# Patient Record
Sex: FEMALE | Race: OTHER | HISPANIC OR LATINO | Employment: UNEMPLOYED | URBAN - METROPOLITAN AREA
[De-identification: names, ages, dates, MRNs, and addresses within clinical notes are randomized per-mention and may not be internally consistent; named-entity substitution may affect disease eponyms.]

---

## 2021-12-12 ENCOUNTER — HOSPITAL ENCOUNTER (EMERGENCY)
Facility: HOSPITAL | Age: 61
Discharge: HOME | End: 2021-12-12
Attending: EMERGENCY MEDICINE

## 2021-12-12 VITALS
DIASTOLIC BLOOD PRESSURE: 78 MMHG | HEIGHT: 63 IN | HEART RATE: 86 BPM | OXYGEN SATURATION: 100 % | RESPIRATION RATE: 20 BRPM | BODY MASS INDEX: 29.53 KG/M2 | SYSTOLIC BLOOD PRESSURE: 156 MMHG | WEIGHT: 166.67 LBS | TEMPERATURE: 98.7 F

## 2021-12-12 DIAGNOSIS — I83.899 BLEEDING FROM VARICOSE VEIN: Primary | ICD-10-CM

## 2021-12-12 PROCEDURE — 90471 IMMUNIZATION ADMIN: CPT

## 2021-12-12 PROCEDURE — 12001 RPR S/N/AX/GEN/TRNK 2.5CM/<: CPT | Performed by: EMERGENCY MEDICINE

## 2021-12-12 PROCEDURE — 99282 EMERGENCY DEPT VISIT SF MDM: CPT | Performed by: EMERGENCY MEDICINE

## 2021-12-12 PROCEDURE — 99283 EMERGENCY DEPT VISIT LOW MDM: CPT

## 2021-12-12 PROCEDURE — 90715 TDAP VACCINE 7 YRS/> IM: CPT | Performed by: EMERGENCY MEDICINE

## 2021-12-12 RX ADMIN — TETANUS TOXOID, REDUCED DIPHTHERIA TOXOID AND ACELLULAR PERTUSSIS VACCINE, ADSORBED 0.5 ML: 5; 2.5; 8; 8; 2.5 SUSPENSION INTRAMUSCULAR at 00:51

## 2024-01-17 ENCOUNTER — TELEPHONE (OUTPATIENT)
Dept: OBGYN CLINIC | Facility: CLINIC | Age: 64
End: 2024-01-17

## 2024-05-15 ENCOUNTER — HOSPITAL ENCOUNTER (EMERGENCY)
Facility: HOSPITAL | Age: 64
Discharge: HOME | End: 2024-05-15
Attending: EMERGENCY MEDICINE
Payer: COMMERCIAL

## 2024-05-15 VITALS
OXYGEN SATURATION: 99 % | DIASTOLIC BLOOD PRESSURE: 93 MMHG | SYSTOLIC BLOOD PRESSURE: 160 MMHG | RESPIRATION RATE: 17 BRPM | TEMPERATURE: 97.9 F | BODY MASS INDEX: 26.17 KG/M2 | WEIGHT: 147.71 LBS | HEART RATE: 83 BPM

## 2024-05-15 DIAGNOSIS — S91.311A LACERATION OF DORSUM OF RIGHT FOOT: Primary | ICD-10-CM

## 2024-05-15 PROCEDURE — 99284 EMERGENCY DEPT VISIT MOD MDM: CPT | Performed by: EMERGENCY MEDICINE

## 2024-05-15 PROCEDURE — 99282 EMERGENCY DEPT VISIT SF MDM: CPT

## 2024-05-15 PROCEDURE — 12001 RPR S/N/AX/GEN/TRNK 2.5CM/<: CPT | Performed by: EMERGENCY MEDICINE

## 2024-05-15 NOTE — ED PROVIDER NOTES
History  Chief Complaint   Patient presents with    Foot Pain     Pt c/o pain to R foot. Pt reports bleeding from varicose vein on R foot. Pt denies trauma or injury to area. Bleeding controlled in triage     HPI    64 year old patient presenting to the emergency department for bleeding to the right foot from a varicose vein. Patient reports she woke up late last night to use the bathroom and noticed her foot bleeding. Patient was able to stop the bleeding at home with pressure. She does not recall a specific injury to her foot. Patient reports this happened before a few years ago and skin glue was applied to her foot to protect it. Patient denies pain to her right foot.     Prior to Admission Medications   Prescriptions Last Dose Informant Patient Reported? Taking?   Cholecalciferol 125 MCG (5000 UT) capsule   Yes No   Sig: Take 5,000 Units by mouth daily   VITAMIN D, ERGOCALCIFEROL, PO   Yes No   Sig: Take by mouth   azelastine (ASTELIN) 0.1 % nasal spray   Yes No   Si-2 sprays into each nostril 2 (two) times a day   fexofenadine (ALLEGRA) 180 MG tablet   Yes No   Sig: Take 180 mg by mouth daily   fluticasone (FLONASE) 50 mcg/act nasal spray   Yes No   Si sprays into each nostril daily   olmesartan (BENICAR) 20 mg tablet   Yes No   Sig: Take 10 mg by mouth daily   vitamin E, tocopherol, 400 units capsule   No No   Sig: Take 1 capsule (400 Units total) by mouth daily      Facility-Administered Medications: None       Past Medical History:   Diagnosis Date    Hypertension     No known health problems        Past Surgical History:   Procedure Laterality Date    BLADDER SURGERY      HYSTERECTOMY         No family history on file.  I have reviewed and agree with the history as documented.    E-Cigarette/Vaping     E-Cigarette/Vaping Substances     Social History     Tobacco Use    Smoking status: Never    Smokeless tobacco: Never   Substance Use Topics    Alcohol use: No    Drug use: No        Review of Systems    Constitutional: Negative.    HENT: Negative.     Eyes: Negative.    Respiratory: Negative.     Cardiovascular: Negative.    Gastrointestinal: Negative.    Genitourinary: Negative.    Musculoskeletal: Negative.    Skin:  Positive for wound.        Small right foot skin tear with no active bleeding upon arrival to the ED   Neurological: Negative.        Physical Exam  ED Triage Vitals [05/15/24 1132]   Temperature Pulse Respirations Blood Pressure SpO2   97.9 °F (36.6 °C) 83 17 160/93 99 %      Temp Source Heart Rate Source Patient Position - Orthostatic VS BP Location FiO2 (%)   Oral Monitor Sitting Right arm --      Pain Score       --             Orthostatic Vital Signs  Vitals:    05/15/24 1132   BP: 160/93   Pulse: 83   Patient Position - Orthostatic VS: Sitting       Physical Exam  HENT:      Head: Normocephalic.      Nose: Nose normal.      Mouth/Throat:      Mouth: Mucous membranes are moist.      Pharynx: Oropharynx is clear.   Eyes:      Pupils: Pupils are equal, round, and reactive to light.   Cardiovascular:      Rate and Rhythm: Normal rate and regular rhythm.      Pulses: Normal pulses.      Heart sounds: Normal heart sounds.   Pulmonary:      Effort: Pulmonary effort is normal.      Breath sounds: Normal breath sounds.   Abdominal:      General: Bowel sounds are normal.   Musculoskeletal:         General: Normal range of motion.      Cervical back: Normal range of motion.   Skin:     Capillary Refill: Capillary refill takes less than 2 seconds.      Findings: Lesion present.      Comments: Two small punctate circular superficial wounds to the anterior lateral right distal ankle/foot with no active bleeding, no pain on palpation, no erythema, and no edema.   Neurological:      Mental Status: She is alert and oriented to person, place, and time.         ED Medications  Medications - No data to display    Diagnostic Studies  Results Reviewed       None                   No orders to display          Procedures  Universal Protocol:  Consent: Verbal consent obtained.  Consent given by: patient  Patient understanding: patient states understanding of the procedure being performed  Patient identity confirmed: verbally with patient  Laceration repair    Date/Time: 5/15/2024 12:18 PM    Performed by: Flavio Ayers DPM  Authorized by: Flavio Ayers DPM  Body area: lower extremity  Location details: right foot  Laceration length: 0.3 cm  Foreign bodies: no foreign bodies  Tendon involvement: none  Nerve involvement: none  Vascular damage: no    Wound Dehiscence:  Superficial Wound Dehiscence: simple closure      Procedure Details:  Irrigation solution: saline  Irrigation method: syringe  Amount of cleaning: standard  Debridement: none  Degree of undermining: none  Approximation difficulty: simple  Patient tolerance: patient tolerated the procedure well with no immediate complications  Comments: After verbal consent was obtained, the two small superficial punctate wounds were cleansed with saline and dried bleed removed. Closure was then achieved with skin adhesive.            ED Course                                       Medical Decision Making  64 year old patient presenting with right foot laceration with no active bleeding upon arrival to the ED. Patient reports no pain and there is no localized erythema and no edema. No clinical suspension for osseous involvement or wound infection. Laceration was cleansed with saline and closed with skin adhesive at bedside, see procedure note. Laceration covered with bandage and surgical shoe applied to right foot to reduce friction to the laceration repair. Patient advised to keep clean, dry bandage over the repair and that the skin adhesive will peel off over the next few days. Advised patient to return to ED if symptoms worsen. Patient is stable for discharge to home.          Disposition  Final diagnoses:   None     ED Disposition       None          Follow-up  Information    None         Patient's Medications   Discharge Prescriptions    No medications on file     No discharge procedures on file.    PDMP Review       None             ED Provider  Attending physically available and evaluated Susan Watson. I managed the patient along with the ED Attending.    Electronically Signed by           Flavio Ayers DPM  05/15/24 0157

## 2024-05-15 NOTE — ED ATTENDING ATTESTATION
5/15/2024  I, Memo Laird MD, saw and evaluated the patient. I have discussed the patient with the resident/non-physician practitioner and agree with the resident's/non-physician practitioner's findings, Plan of Care, and MDM as documented in the resident's/non-physician practitioner's note, except where noted. All available labs and Radiology studies were reviewed.  I was present for key portions of any procedure(s) performed by the resident/non-physician practitioner and I was immediately available to provide assistance.       At this point I agree with the current assessment done in the Emergency Department.  I have conducted an independent evaluation of this patient a history and physical is as follows:      Right foot area over varicose vein was bleeding. Has excoriation directly over varicose vein. Does not look infected. It is not currently bleeding. There are 2 areas. Skin glue. Can follow up with vascular as an outpatient. Surgical shoe as this area is over an inconvenient place where shoes may rub on this area.  ED Course         Critical Care Time  Procedures

## 2024-05-15 NOTE — DISCHARGE INSTRUCTIONS
Wear surgical shoe for the next 3-4 days to prevent excessive friction to the laceration site  Follow up with PCP as needed if area become painful, red, and swollen  Keep laceration covered with a clean bandage until healed

## 2024-07-15 ENCOUNTER — CONSULT (OUTPATIENT)
Dept: VASCULAR SURGERY | Facility: CLINIC | Age: 64
End: 2024-07-15
Payer: COMMERCIAL

## 2024-07-15 VITALS
DIASTOLIC BLOOD PRESSURE: 90 MMHG | HEART RATE: 74 BPM | OXYGEN SATURATION: 98 % | BODY MASS INDEX: 26.4 KG/M2 | HEIGHT: 63 IN | WEIGHT: 149 LBS | SYSTOLIC BLOOD PRESSURE: 142 MMHG

## 2024-07-15 DIAGNOSIS — I83.891 BLEEDING FROM VARICOSE VEINS OF LOWER EXTREMITY, RIGHT: Primary | ICD-10-CM

## 2024-07-15 DIAGNOSIS — I87.2 VENOUS INSUFFICIENCY: ICD-10-CM

## 2024-07-15 PROCEDURE — 99244 OFF/OP CNSLTJ NEW/EST MOD 40: CPT | Performed by: NURSE PRACTITIONER

## 2024-07-15 NOTE — ASSESSMENT & PLAN NOTE
Reports R lateral distal ankle had 3 bleeding events. Most recently 3 weeks ago.   -Discussed sclerotherapy and informed pt and family that first visit is for consultation purposes only.   -Educated on conservative treatment for now until f/u appointment.

## 2024-07-15 NOTE — PROGRESS NOTES
Ambulatory Visit  Name: Susan Watson      : 1960      MRN: [id number]  Encounter Provider: CLOVIS Post  Encounter Date: 7/15/2024   Encounter department: [facility]    64-year-old female with no pmhx presents to the office with hx of a RLE bleeding vein about 3 weeks ago.    Assessment & Plan   1. Bleeding from varicose veins of lower extremity, right  Assessment & Plan:  Reports R lateral distal ankle had 3 bleeding events. Most recently 3 weeks ago.   -Discussed sclerotherapy and informed pt and family that first visit is for consultation purposes only.   -Educated on conservative treatment for now until f/u appointment.       Orders:  -     Compression Stocking  2. Venous insufficiency  Assessment & Plan:  Pt presents with b/l R>L leg swelling.     -Reviewed LEV from outside hospital from 24 which demonstrates no evidence of DVT in the visualized bilateral lower extremity.   -No evident truncal/ large varicosities.   -Denies use of compression in the past. Denies leg elevation.   -No hx of vein surgeries.  -Reports b/l burning in the dorsum on feet with throbbing in the medial ankles.   -Palpable 2+ DP and PT pulses. No concerns for arterial disease.   -Reviewed pathophysiology of venous insufficiencey and treatment with conservative measures at this time with leg compression, elevation and exercise. Pt verbalized understanding and is agreeable to this plan.     Recommendations   -Return to the office in 3 months for f/u with compression.   -Start wearing compression. RX given today with education on how to use.  -Leg elevation. Goal of 3-4 times a day 15 minutes at a time.  -Increase exercise and ambulation.Goal of 3-4 times a week for 30 min.  -Apply moisturizing cream to the b/l legs to maintain skin integrity and prevent breakdown.  -Call the office with any new or worsening symptoms.   Orders:  -     Ambulatory Referral to Vascular Surgery  -     Compression  Stocking      History of Present Illness     Patient presents for evaluation of lower extremities. She reports a bleeding vein from her right foot 3 weeks ago. She reports pain and swelling of BLE, right is worse than left. She denies compression usage. She is a non smoker.       Susan Watson is a 64 y.o. female who presents with no pmhx presents to the office with hx of a RLE bleeding vein about 3 weeks ago.    R lateral ankle had bleeding incident x3.   When she was finished taking a bath, it started bleeding. The last time 3 weeks ago she was sleeping and got up to go to the bathroom when the R lateral ankle started bleeding.   Once she was seen in the ED for the bleeding area and they gave her recommendations to elevated her leg. That was one year ago.   She reports pain with walking b/l burning and stabbing. Burning on the bottoms of her feet. Throbbing at the medial calfes.   Review of Systems   Constitutional: Negative.    HENT: Negative.     Eyes: Negative.    Respiratory: Negative.     Cardiovascular:  Positive for leg swelling (RLE).   Gastrointestinal: Negative.    Endocrine: Negative.    Genitourinary: Negative.    Musculoskeletal: Negative.    Skin: Negative.    Allergic/Immunologic: Negative.    Neurological: Negative.    Hematological: Negative.    Psychiatric/Behavioral: Negative.       Medical History Reviewed by provider this encounter:  Tobacco  Allergies  Meds  Problems  Med Hx  Surg Hx  Fam Hx       Past Medical History   Past Medical History:   Diagnosis Date    Hypertension     No known health problems      Past Surgical History:   Procedure Laterality Date    BLADDER SURGERY      HYSTERECTOMY       History reviewed. No pertinent family history.  Current Outpatient Medications on File Prior to Visit   Medication Sig Dispense Refill    azelastine (ASTELIN) 0.1 % nasal spray 1-2 sprays into each nostril 2 (two) times a day      fexofenadine (ALLEGRA) 180 MG tablet Take 180 mg by  "mouth daily      VITAMIN D, ERGOCALCIFEROL, PO Take by mouth      vitamin E, tocopherol, 400 units capsule Take 1 capsule (400 Units total) by mouth daily 30 capsule 11    Cholecalciferol 125 MCG (5000 UT) capsule Take 5,000 Units by mouth daily      fluticasone (FLONASE) 50 mcg/act nasal spray 2 sprays into each nostril daily      olmesartan (BENICAR) 20 mg tablet Take 10 mg by mouth daily       No current facility-administered medications on file prior to visit.     Allergies   Allergen Reactions    Corticosteroids Hives    Cortisone     Hydrocortisone Rash      Current Outpatient Medications on File Prior to Visit   Medication Sig Dispense Refill    azelastine (ASTELIN) 0.1 % nasal spray 1-2 sprays into each nostril 2 (two) times a day      fexofenadine (ALLEGRA) 180 MG tablet Take 180 mg by mouth daily      VITAMIN D, ERGOCALCIFEROL, PO Take by mouth      vitamin E, tocopherol, 400 units capsule Take 1 capsule (400 Units total) by mouth daily 30 capsule 11    Cholecalciferol 125 MCG (5000 UT) capsule Take 5,000 Units by mouth daily      fluticasone (FLONASE) 50 mcg/act nasal spray 2 sprays into each nostril daily      olmesartan (BENICAR) 20 mg tablet Take 10 mg by mouth daily       No current facility-administered medications on file prior to visit.      Social History     Tobacco Use    Smoking status: Never    Smokeless tobacco: Never   Substance and Sexual Activity    Alcohol use: No    Drug use: No    Sexual activity: Yes     Objective     /90 (BP Location: Left arm, Patient Position: Sitting, Cuff Size: Standard)   Pulse 74   Ht 5' 3\" (1.6 m)   Wt 67.6 kg (149 lb)   SpO2 98%   BMI 26.39 kg/m²     Physical Exam  Vitals and nursing note reviewed.   Constitutional:       General: She is not in acute distress.     Appearance: Normal appearance. She is not ill-appearing.   HENT:      Head: Normocephalic and atraumatic.   Cardiovascular:      Rate and Rhythm: Normal rate and regular rhythm.      " Pulses: Normal pulses.           Dorsalis pedis pulses are 2+ on the right side and 2+ on the left side.        Posterior tibial pulses are 2+ on the right side and 2+ on the left side.      Heart sounds: No murmur heard.  Pulmonary:      Effort: Pulmonary effort is normal. No respiratory distress.      Breath sounds: Normal breath sounds.   Musculoskeletal:      Right lower le+ Edema present.      Left lower le+ Edema present.   Skin:     General: Skin is warm and dry.      Findings: No erythema or rash.   Neurological:      General: No focal deficit present.      Mental Status: She is alert and oriented to person, place, and time.      Sensory: Sensory deficit present.      Motor: No weakness.      Gait: Gait normal.   Psychiatric:         Mood and Affect: Mood normal.         Behavior: Behavior normal.       Administrative Statements   I have spent a total time of 30 minutes in caring for this patient on the day of the visit/encounter including Diagnostic results, Risks and benefits of tx options, Instructions for management, Patient and family education, Importance of tx compliance, Risk factor reductions, Documenting in the medical record, Reviewing / ordering tests, medicine, procedures  , and Obtaining or reviewing history  .

## 2024-07-15 NOTE — ASSESSMENT & PLAN NOTE
Pt presents with b/l R>L leg swelling.     -Reviewed LEV from outside hospital from 6/28/24 which demonstrates no evidence of DVT in the visualized bilateral lower extremity.   -No evident truncal/ large varicosities.   -Denies use of compression in the past. Denies leg elevation.   -No hx of vein surgeries.  -Reports b/l burning in the dorsum on feet with throbbing in the medial ankles.   -Palpable 2+ DP and PT pulses. No concerns for arterial disease.   -Reviewed pathophysiology of venous insufficiencey and treatment with conservative measures at this time with leg compression, elevation and exercise. Pt verbalized understanding and is agreeable to this plan.     Recommendations   -Return to the office in 3 months for f/u with compression.   -Start wearing compression. RX given today with education on how to use.  -Leg elevation. Goal of 3-4 times a day 15 minutes at a time.  -Increase exercise and ambulation.Goal of 3-4 times a week for 30 min.  -Apply moisturizing cream to the b/l legs to maintain skin integrity and prevent breakdown.  -Call the office with any new or worsening symptoms.

## 2024-07-15 NOTE — PATIENT INSTRUCTIONS
"- Call the office if you experience any changes to your legs or feet such as new pain, redness or swelling.    - Stay active. Exercise everyday. Walking is the recommended exercise with a goal of 30 min 3-4 times a week. A healthy weight can assist in decreasing varicose vein symptoms.     - Wear Compression. Put them on in the morning, wear all day and take off before bed at night.     -Elevate your legs above the level of your heart. Elevate for 15 minutes 3-4 times a day.     -Keep a low sodium diet    -When looking at buying compression, look for \"gradient compression\" with a weight 20-30mmHg (medium weight), knee high is fine.  -Try \"Arctrieval\"    -A good brand is Sigvaris, soft opaque, knee high.   "

## 2024-08-13 ENCOUNTER — TELEPHONE (OUTPATIENT)
Dept: VASCULAR SURGERY | Facility: CLINIC | Age: 64
End: 2024-08-13

## 2024-08-13 NOTE — TELEPHONE ENCOUNTER
Called patient due to being a no show for her appointment today 8/13/2024, for injection.  Patient said that she is sick today.  She said that her daughter will call back to reschedule the appointment.

## 2024-09-04 ENCOUNTER — HOSPITAL ENCOUNTER (EMERGENCY)
Facility: HOSPITAL | Age: 64
Discharge: HOME | End: 2024-09-04
Attending: EMERGENCY MEDICINE
Payer: COMMERCIAL

## 2024-09-04 VITALS
TEMPERATURE: 97.8 F | BODY MASS INDEX: 25.7 KG/M2 | HEART RATE: 62 BPM | OXYGEN SATURATION: 100 % | WEIGHT: 145.06 LBS | SYSTOLIC BLOOD PRESSURE: 122 MMHG | RESPIRATION RATE: 16 BRPM | DIASTOLIC BLOOD PRESSURE: 77 MMHG

## 2024-09-04 DIAGNOSIS — L50.9 URTICARIA: Primary | ICD-10-CM

## 2024-09-04 PROCEDURE — 99284 EMERGENCY DEPT VISIT MOD MDM: CPT

## 2024-09-04 PROCEDURE — 99282 EMERGENCY DEPT VISIT SF MDM: CPT

## 2024-09-04 RX ORDER — PREDNISONE 20 MG/1
60 TABLET ORAL ONCE
Status: COMPLETED | OUTPATIENT
Start: 2024-09-04 | End: 2024-09-04

## 2024-09-04 RX ORDER — EPINEPHRINE 0.3 MG/.3ML
0.3 INJECTION SUBCUTANEOUS ONCE
Qty: 0.6 ML | Refills: 0 | Status: SHIPPED | OUTPATIENT
Start: 2024-09-04 | End: 2024-09-04

## 2024-09-04 RX ORDER — FAMOTIDINE 20 MG/1
20 TABLET, FILM COATED ORAL 2 TIMES DAILY
Qty: 30 TABLET | Refills: 0 | Status: SHIPPED | OUTPATIENT
Start: 2024-09-04

## 2024-09-04 RX ORDER — FAMOTIDINE 20 MG/1
20 TABLET, FILM COATED ORAL ONCE
Status: COMPLETED | OUTPATIENT
Start: 2024-09-04 | End: 2024-09-04

## 2024-09-04 RX ORDER — DIPHENHYDRAMINE HCL 25 MG
25 TABLET ORAL ONCE
Status: COMPLETED | OUTPATIENT
Start: 2024-09-04 | End: 2024-09-04

## 2024-09-04 RX ORDER — DIPHENHYDRAMINE HCL 25 MG
25 TABLET ORAL EVERY 6 HOURS
Qty: 20 TABLET | Refills: 0 | Status: SHIPPED | OUTPATIENT
Start: 2024-09-04

## 2024-09-04 RX ORDER — PREDNISONE 20 MG/1
60 TABLET ORAL DAILY
Qty: 6 TABLET | Refills: 0 | Status: SHIPPED | OUTPATIENT
Start: 2024-09-04 | End: 2024-09-06

## 2024-09-04 RX ADMIN — DIPHENHYDRAMINE HYDROCHLORIDE 25 MG: 25 TABLET ORAL at 11:46

## 2024-09-04 RX ADMIN — PREDNISONE 60 MG: 20 TABLET ORAL at 11:47

## 2024-09-04 RX ADMIN — FAMOTIDINE 20 MG: 20 TABLET, FILM COATED ORAL at 11:48

## 2024-09-04 NOTE — DISCHARGE INSTRUCTIONS
Patient advised follow-up PCP for today's ED visit.  Patient advised to return to the ED with any worsening symptoms explained on discharge.  Patient advised to follow-up with allergist for today's ED visit.  Patient was advised to return to the ED with any worsening symptoms that were explained on discharge including but not limited to chest pain, shortness of breath, irretractable vomiting or diarrhea, vision loss, loss of function, loss of sensation, syncope, hemoptysis, hematochezia, hematemesis, melena, decreased oral intake, feeling ill.

## 2024-09-04 NOTE — Clinical Note
Susan Watson was seen and treated in our emergency department on 9/4/2024.                Diagnosis: allegic reaction    Susan  .    She may return on this date: 09/05/2024         If you have any questions or concerns, please don't hesitate to call.      Larry Dueñas PA-C    ______________________________           _______________          _______________  Hospital Representative                              Date                                Time

## 2024-09-04 NOTE — ED PROVIDER NOTES
History  Chief Complaint   Patient presents with    Rash     Pt c/o whole body rash that started yesterday. Pt states the rash itches. Pt denies cp/sob/n/v/d or fevers     64-year-old female presenting ED with generalized body rash.  Patient states the symptoms started yesterday denies any new exposures.  Denies any new perfumes or lotions or detergents.  Patient states that when the symptoms started yesterday she did use an EpiPen without relief of symptoms.  She denies trying any other medications.  Patient stated that the rash is pruritic.  She denies any shortness of breath or trouble breathing.  Denies any tongue swelling or facial swelling at this time.  Patient stated that the rash has been getting worse.  She denies any chest pain along with the rash.  Patient stated that she does have an allergy to steroids as a long time ago she had erythema multiforme and received a hydrocortisone injection in which she had some facial swelling.  She stated she has since had no reaction to any steroids.Patient denies any chest pain, shortness of breath, vomiting, diarrhea, chills, diaphoresis, fevers, loss of consciousness, syncope, urinary and bowel changes, abdominal pain, visual symptoms, vision loss, loss of function, loss of sensation, decreased oral intake, hemoptysis, hematochezia, hematemesis, melena, confusion.         Prior to Admission Medications   Prescriptions Last Dose Informant Patient Reported? Taking?   Cholecalciferol 125 MCG (5000 UT) capsule   Yes No   Sig: Take 5,000 Units by mouth daily   VITAMIN D, ERGOCALCIFEROL, PO  Self Yes No   Sig: Take by mouth   azelastine (ASTELIN) 0.1 % nasal spray  Self Yes No   Si-2 sprays into each nostril 2 (two) times a day   fexofenadine (ALLEGRA) 180 MG tablet  Self Yes No   Sig: Take 180 mg by mouth daily   fluticasone (FLONASE) 50 mcg/act nasal spray   Yes No   Si sprays into each nostril daily   olmesartan (BENICAR) 20 mg tablet   Yes No   Sig: Take 10 mg  by mouth daily   vitamin E, tocopherol, 400 units capsule  Self No No   Sig: Take 1 capsule (400 Units total) by mouth daily      Facility-Administered Medications: None       Past Medical History:   Diagnosis Date    Hypertension     No known health problems        Past Surgical History:   Procedure Laterality Date    BLADDER SURGERY      HYSTERECTOMY         History reviewed. No pertinent family history.  I have reviewed and agree with the history as documented.    E-Cigarette/Vaping    E-Cigarette Use Never User      E-Cigarette/Vaping Substances     Social History     Tobacco Use    Smoking status: Never    Smokeless tobacco: Never   Vaping Use    Vaping status: Never Used   Substance Use Topics    Alcohol use: No    Drug use: No       Review of Systems   Constitutional:  Negative for chills, diaphoresis, fatigue and fever.   HENT:  Negative for congestion, drooling, ear discharge, ear pain, postnasal drip, rhinorrhea, sinus pressure, sinus pain and sore throat.    Eyes:  Negative for photophobia and visual disturbance.   Respiratory:  Negative for cough, chest tightness and shortness of breath.    Cardiovascular:  Negative for chest pain and palpitations.   Gastrointestinal:  Negative for abdominal distention, abdominal pain, constipation, diarrhea, nausea and vomiting.   Genitourinary:  Negative for difficulty urinating, dysuria, flank pain, frequency and hematuria.   Musculoskeletal:  Negative for arthralgias, back pain, joint swelling, myalgias, neck pain and neck stiffness.   Skin:  Positive for rash. Negative for wound.   Neurological:  Negative for dizziness, tremors, syncope, facial asymmetry, weakness, light-headedness, numbness and headaches.       Physical Exam  Physical Exam  Constitutional:       General: She is not in acute distress.     Appearance: Normal appearance. She is not ill-appearing, toxic-appearing or diaphoretic.   HENT:      Head: Normocephalic.      Right Ear: Tympanic membrane, ear  canal and external ear normal.      Left Ear: Tympanic membrane, ear canal and external ear normal.      Nose: Nose normal. No congestion or rhinorrhea.      Mouth/Throat:      Mouth: Mucous membranes are moist.      Pharynx: Oropharynx is clear. No oropharyngeal exudate or posterior oropharyngeal erythema.   Eyes:      General:         Right eye: No discharge.         Left eye: No discharge.      Extraocular Movements: Extraocular movements intact.      Conjunctiva/sclera: Conjunctivae normal.      Pupils: Pupils are equal, round, and reactive to light.   Cardiovascular:      Rate and Rhythm: Normal rate and regular rhythm.      Pulses: Normal pulses.   Pulmonary:      Effort: Pulmonary effort is normal. No respiratory distress.      Breath sounds: Normal breath sounds. No stridor. No wheezing, rhonchi or rales.   Chest:      Chest wall: No tenderness.   Abdominal:      General: Bowel sounds are normal. There is no distension.      Palpations: Abdomen is soft.      Tenderness: There is no abdominal tenderness. There is no right CVA tenderness, left CVA tenderness, guarding or rebound.   Musculoskeletal:         General: No swelling, tenderness, deformity or signs of injury. Normal range of motion.      Cervical back: Neck supple. No rigidity or tenderness.   Lymphadenopathy:      Cervical: No cervical adenopathy.   Skin:     General: Skin is warm and dry.      Capillary Refill: Capillary refill takes less than 2 seconds.      Findings: Rash present.      Comments: Generalized urticarial rash.   Neurological:      Mental Status: She is alert and oriented to person, place, and time.      Sensory: No sensory deficit.   Psychiatric:         Mood and Affect: Mood normal.         Vital Signs  ED Triage Vitals [09/04/24 1105]   Temperature Pulse Respirations Blood Pressure SpO2   97.8 °F (36.6 °C) 67 17 140/74 98 %      Temp Source Heart Rate Source Patient Position - Orthostatic VS BP Location FiO2 (%)   Oral Monitor  Sitting Right arm --      Pain Score       10 - Worst Possible Pain           Vitals:    09/04/24 1105 09/04/24 1346   BP: 140/74 122/77   Pulse: 67 62   Patient Position - Orthostatic VS: Sitting Lying         Visual Acuity      ED Medications  Medications   predniSONE tablet 60 mg (60 mg Oral Given 9/4/24 1147)   diphenhydrAMINE (BENADRYL) tablet 25 mg (25 mg Oral Given 9/4/24 1146)   famotidine (PEPCID) tablet 20 mg (20 mg Oral Given 9/4/24 1148)       Diagnostic Studies  Results Reviewed       None                   No orders to display              Procedures  Procedures         ED Course                                 SBIRT 22yo+      Flowsheet Row Most Recent Value   Initial Alcohol Screen: US AUDIT-C     1. How often do you have a drink containing alcohol? 0 Filed at: 09/04/2024 1112   2. How many drinks containing alcohol do you have on a typical day you are drinking?  0 Filed at: 09/04/2024 1112   3a. Male UNDER 65: How often do you have five or more drinks on one occasion? 0 Filed at: 09/04/2024 1112   3b. FEMALE Any Age, or MALE 65+: How often do you have 4 or more drinks on one occassion? 0 Filed at: 09/04/2024 1112   Audit-C Score 0 Filed at: 09/04/2024 1112   DANIELLE: How many times in the past year have you...    Used an illegal drug or used a prescription medication for non-medical reasons? Never Filed at: 09/04/2024 1112                      Medical Decision Making  64-year-old female presented ED for rash over full body.  On exam cardiopulmonary exam is benign.  Patient's airway is patent.  No facial swelling or tongue swelling noted on exam.  Patient does have a urticaria rash over the back or so arms sparing the hands buttocks explosion thighs.  She denies any contact sensitivities or new lotions detergents or fragrances.  Abdominal exam is benign.  Nonpainful lesions but they are pruritic.  So patient did have a reaction to a cortisone injection which she received many years ago.  He advised  "patient we will try prednisone and keep the area for 2 hours to monitor as then they started if she were to develop any reaction to this.  Patient was agreeable to this.  Over the 2 hours patient's symptoms continually improved.  Patient also given famotidine and Benadryl in the ED.  Patient stated that although she still has not A lesion to her symptoms of the rash have completely dissipated.  At the 3-hour juana I did explain to the patient that she has not had any reaction and she is feeling much better we will send her home at this time.  Patient discharged with 2 days worth of prednisone along with famotidine and Benadryl for symptom relief.  Patient also sent with EpiPen which was sent to the pharmacy.  Patient given strict return protocol with any worsening of the rash or any worsening symptoms.  Status is imperative if she has any facial swelling that she will need to return to the ED.  Ambulatory referral to allergist was made for possible patch testing.  Disposition explained with follow-up.Patient understood diagnosis and treatment plan and had no further questions.  Patient was discharged in stable condition.  Patient was advised to follow-up with her PCP in 1 to 2 days.  Patient was advised to return to the ED with any worsening symptoms that were explained on discharge including but not limited to chest pain, shortness of breath, irretractable vomiting or diarrhea, vision loss, loss of function, loss of sensation, syncope, hemoptysis, hematochezia, hematemesis, melena, decreased oral intake, feeling ill.     Ddx-allergic reaction, contact dermatitis, urticaria, anaphylaxis, dermatitis    Portions of the record may have been created with voice recognition software. Occasional wrong word or \"sound a like\" substitutions may have occurred due to the inherent limitations of voice recognition software. Read the chart carefully and recognize, using context, where substitutions have occurred.      Risk  OTC " drugs.  Prescription drug management.  Risk Details: Risk of worsening symptoms along with signs and symptoms worsening symptoms were thoroughly explained on discharge.  Risk of incomplete follow-up was discussed.  Patient had full understanding of all risks had no further questions and was discharged in stable condition.                  Disposition  Final diagnoses:   Urticaria     Time reflects when diagnosis was documented in both MDM as applicable and the Disposition within this note       Time User Action Codes Description Comment    9/4/2024  2:10 PM Larry Dueñas Add [L50.9] Urticaria           ED Disposition       ED Disposition   Discharge    Condition   Stable    Date/Time   Wed Sep 4, 2024 1412    Comment   Susan Watson discharge to home/self care.                   Follow-up Information       Follow up With Specialties Details Why Contact Info Additional Information    Kourtney Echeverria MD Family Medicine   [facility] [address] [address]  [phone]       [facility] Emergency Medicine   [address] [place] [id number] [phone] [facility] Department, [address] [place] [address], [id number]            Discharge Medication List as of 9/4/2024  2:13 PM        START taking these medications    Details   diphenhydrAMINE (BENADRYL) 25 mg tablet Take 1 tablet (25 mg total) by mouth every 6 (six) hours, Starting Wed 9/4/2024, Normal      EPINEPHrine (EPIPEN) 0.3 mg/0.3 mL SOAJ Inject 0.3 mL (0.3 mg total) into a muscle once for 1 dose, Starting Wed 9/4/2024, Normal      famotidine (PEPCID) 20 mg tablet Take 1 tablet (20 mg total) by mouth 2 (two) times a day, Starting Wed 9/4/2024, Normal      predniSONE 20 mg tablet Take 3 tablets (60 mg total) by mouth daily for 2 days, Starting Wed 9/4/2024, Until Fri 9/6/2024, Normal           CONTINUE these medications which  have NOT CHANGED    Details   azelastine (ASTELIN) 0.1 % nasal spray 1-2 sprays into each nostril 2 (two) times a day, Starting Wed 11/23/2022, Historical Med      Cholecalciferol 125 MCG (5000 UT) capsule Take 5,000 Units by mouth daily, Starting Wed 7/13/2022, Until Thu 7/13/2023, Historical Med      fexofenadine (ALLEGRA) 180 MG tablet Take 180 mg by mouth daily, Starting Wed 11/23/2022, Historical Med      fluticasone (FLONASE) 50 mcg/act nasal spray 2 sprays into each nostril daily, Starting Wed 7/13/2022, Until Thu 7/13/2023, Historical Med      olmesartan (BENICAR) 20 mg tablet Take 10 mg by mouth daily, Starting Thu 11/3/2022, Until Fri 11/3/2023, Historical Med      VITAMIN D, ERGOCALCIFEROL, PO Take by mouth, Historical Med      vitamin E, tocopherol, 400 units capsule Take 1 capsule (400 Units total) by mouth daily, Starting Tue 1/10/2023, Normal                 PDMP Review       None            ED Provider  Electronically Signed by             Larry Dueñas PA-C  09/04/24 [id number]